# Patient Record
Sex: MALE | URBAN - METROPOLITAN AREA
[De-identification: names, ages, dates, MRNs, and addresses within clinical notes are randomized per-mention and may not be internally consistent; named-entity substitution may affect disease eponyms.]

---

## 2020-06-02 ENCOUNTER — OFFICE VISIT (OUTPATIENT)
Dept: URBAN - METROPOLITAN AREA CLINIC 62 | Facility: CLINIC | Age: 50
End: 2020-06-02
Payer: COMMERCIAL

## 2020-06-02 DIAGNOSIS — H25.13 AGE-RELATED NUCLEAR CATARACT, BILATERAL: Chronic | ICD-10-CM

## 2020-06-02 DIAGNOSIS — E11.9 TYPE 2 DIABETES MELLITUS W/O COMPLICATION: Primary | Chronic | ICD-10-CM

## 2020-06-02 DIAGNOSIS — Z79.4 LONG TERM (CURRENT) USE OF INSULIN: Chronic | ICD-10-CM

## 2020-06-02 DIAGNOSIS — H31.022 SOLAR RETINOPATHY, LEFT EYE: Chronic | ICD-10-CM

## 2020-06-02 PROCEDURE — 92134 CPTRZ OPH DX IMG PST SGM RTA: CPT | Performed by: OPTOMETRIST

## 2020-06-02 PROCEDURE — 92004 COMPRE OPH EXAM NEW PT 1/>: CPT | Performed by: OPTOMETRIST

## 2020-06-02 ASSESSMENT — KERATOMETRY
OD: 43.38
OS: 43.63

## 2020-06-02 ASSESSMENT — VISUAL ACUITY
OS: 20/40
OD: 20/20

## 2020-06-02 ASSESSMENT — INTRAOCULAR PRESSURE
OD: 14
OS: 14

## 2020-06-02 NOTE — IMPRESSION/PLAN
Impression: Type 2 diabetes mellitus w/o complication: J89.1. Plan: Diabetes type II: no background diabetic retinopathy, no signs of neovascularization noted. Discussed ocular and systemic benefits of blood sugar control. Continue to monitor for changes. Advised patient to RTC immediately if changes to vision are noted.

## 2020-06-02 NOTE — IMPRESSION/PLAN
Impression: Solar retinopathy, left eye: H31.022. Plan: Stable vision OS per patient. Previously followed by retina and diagnosed as solar retinopathy. Negative history of sun gazing or solar eclipse. Discussed diagnosis with patient in detail. Order OCT ON, OD normal OS foveal OS/IS disruption. Will continue to observe condition and/or symptoms. Patient instructed to call if condition gets worse.

## 2020-06-02 NOTE — IMPRESSION/PLAN
Impression: Age-related nuclear cataract, bilateral: H25.13. Plan: Trace OU, observe. No treatment currently recommended as cataracts do not affect the patients day to day life. Patient to monitor for vision changes and if vision significantly worsens, advised to RTC for evaluation.

## 2021-03-17 ENCOUNTER — OFFICE VISIT (OUTPATIENT)
Dept: URBAN - METROPOLITAN AREA CLINIC 63 | Facility: CLINIC | Age: 51
End: 2021-03-17

## 2021-03-17 DIAGNOSIS — H52.4 PRESBYOPIA: Primary | ICD-10-CM

## 2021-03-17 ASSESSMENT — VISUAL ACUITY
OD: 20/20
OS: 20/30

## 2021-03-17 ASSESSMENT — INTRAOCULAR PRESSURE
OS: 15
OD: 15

## 2021-03-17 NOTE — IMPRESSION/PLAN
Impression: Presbyopia: H52.4. Plan: Give Rx for glass and instructed on normal adaptation period. none